# Patient Record
Sex: FEMALE | Race: WHITE | NOT HISPANIC OR LATINO | Employment: FULL TIME | URBAN - METROPOLITAN AREA
[De-identification: names, ages, dates, MRNs, and addresses within clinical notes are randomized per-mention and may not be internally consistent; named-entity substitution may affect disease eponyms.]

---

## 2023-03-15 ENCOUNTER — HOSPITAL ENCOUNTER (EMERGENCY)
Facility: HOSPITAL | Age: 68
Discharge: HOME | End: 2023-03-15
Attending: STUDENT IN AN ORGANIZED HEALTH CARE EDUCATION/TRAINING PROGRAM
Payer: MEDICARE

## 2023-03-15 VITALS
DIASTOLIC BLOOD PRESSURE: 80 MMHG | HEIGHT: 66 IN | HEART RATE: 77 BPM | SYSTOLIC BLOOD PRESSURE: 162 MMHG | TEMPERATURE: 98 F | WEIGHT: 165 LBS | OXYGEN SATURATION: 100 % | BODY MASS INDEX: 26.52 KG/M2 | RESPIRATION RATE: 20 BRPM
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
## 2023-03-15 DIAGNOSIS — M62.838 MUSCLE SPASMS OF NECK: ICD-10-CM

## 2023-03-15 DIAGNOSIS — M54.2 NECK PAIN: Primary | ICD-10-CM

## 2023-03-15 PROCEDURE — 25000003 PHARM REV CODE 250: Performed by: STUDENT IN AN ORGANIZED HEALTH CARE EDUCATION/TRAINING PROGRAM

## 2023-03-15 PROCEDURE — 99284 EMERGENCY DEPT VISIT MOD MDM: CPT | Mod: 25

## 2023-03-15 RX ORDER — METHOCARBAMOL 500 MG/1
1000 TABLET, FILM COATED ORAL 3 TIMES DAILY
Qty: 30 TABLET | Refills: 0 | Status: SHIPPED | OUTPATIENT
Start: 2023-03-15 | End: 2023-03-20

## 2023-03-15 RX ORDER — METHOCARBAMOL 500 MG/1
1000 TABLET, FILM COATED ORAL
Status: COMPLETED | OUTPATIENT
Start: 2023-03-15 | End: 2023-03-15

## 2023-03-15 RX ORDER — IBUPROFEN 600 MG/1
600 TABLET ORAL
Status: DISCONTINUED | OUTPATIENT
Start: 2023-03-15 | End: 2023-03-15 | Stop reason: HOSPADM

## 2023-03-15 RX ORDER — NAPROXEN 500 MG/1
500 TABLET ORAL 2 TIMES DAILY WITH MEALS
Qty: 20 TABLET | Refills: 0 | Status: SHIPPED | OUTPATIENT
Start: 2023-03-15 | End: 2023-03-25

## 2023-03-15 RX ADMIN — METHOCARBAMOL 1000 MG: 500 TABLET ORAL at 06:03

## 2023-04-26 ENCOUNTER — HOSPITAL ENCOUNTER (EMERGENCY)
Facility: HOSPITAL | Age: 68
Discharge: HOME | End: 2023-04-27
Attending: EMERGENCY MEDICINE
Payer: MEDICARE

## 2023-04-26 DIAGNOSIS — G50.0 TRIGEMINAL NEURALGIA: Primary | ICD-10-CM

## 2023-04-26 LAB
ALBUMIN SERPL BCP-MCNC: 3.5 G/DL (ref 3.5–5.2)
ALP SERPL-CCNC: 79 U/L (ref 55–135)
ALT SERPL W/O P-5'-P-CCNC: 13 U/L (ref 10–44)
ANION GAP SERPL CALC-SCNC: 11 MMOL/L (ref 8–16)
AST SERPL-CCNC: 12 U/L (ref 10–40)
BASOPHILS # BLD AUTO: 0.02 K/UL (ref 0–0.2)
BASOPHILS NFR BLD: 0.2 % (ref 0–1.9)
BILIRUB SERPL-MCNC: 0.2 MG/DL (ref 0.1–1)
BUN SERPL-MCNC: 14 MG/DL (ref 8–23)
CALCIUM SERPL-MCNC: 9.3 MG/DL (ref 8.7–10.5)
CHLORIDE SERPL-SCNC: 105 MMOL/L (ref 95–110)
CO2 SERPL-SCNC: 24 MMOL/L (ref 23–29)
CREAT SERPL-MCNC: 0.8 MG/DL (ref 0.5–1.4)
DIFFERENTIAL METHOD: ABNORMAL
EOSINOPHIL # BLD AUTO: 0.1 K/UL (ref 0–0.5)
EOSINOPHIL NFR BLD: 1.4 % (ref 0–8)
ERYTHROCYTE [DISTWIDTH] IN BLOOD BY AUTOMATED COUNT: 12.5 % (ref 11.5–14.5)
ERYTHROCYTE [SEDIMENTATION RATE] IN BLOOD BY PHOTOMETRIC METHOD: 18 MM/HR (ref 0–36)
EST. GFR  (NO RACE VARIABLE): >60 ML/MIN/1.73 M^2
GLUCOSE SERPL-MCNC: 93 MG/DL (ref 70–110)
HCT VFR BLD AUTO: 39.2 % (ref 37–48.5)
HGB BLD-MCNC: 12.7 G/DL (ref 12–16)
IMM GRANULOCYTES # BLD AUTO: 0.02 K/UL (ref 0–0.04)
IMM GRANULOCYTES NFR BLD AUTO: 0.2 % (ref 0–0.5)
LYMPHOCYTES # BLD AUTO: 1.3 K/UL (ref 1–4.8)
LYMPHOCYTES NFR BLD: 14.7 % (ref 18–48)
MCH RBC QN AUTO: 31.1 PG (ref 27–31)
MCHC RBC AUTO-ENTMCNC: 32.4 G/DL (ref 32–36)
MCV RBC AUTO: 96 FL (ref 82–98)
MONOCYTES # BLD AUTO: 0.7 K/UL (ref 0.3–1)
MONOCYTES NFR BLD: 7.8 % (ref 4–15)
NEUTROPHILS # BLD AUTO: 6.7 K/UL (ref 1.8–7.7)
NEUTROPHILS NFR BLD: 75.7 % (ref 38–73)
NRBC BLD-RTO: 0 /100 WBC
PLATELET # BLD AUTO: 203 K/UL (ref 150–450)
PMV BLD AUTO: 10.5 FL (ref 9.2–12.9)
POTASSIUM SERPL-SCNC: 4.1 MMOL/L (ref 3.5–5.1)
PROT SERPL-MCNC: 6.8 G/DL (ref 6–8.4)
RBC # BLD AUTO: 4.08 M/UL (ref 4–5.4)
SODIUM SERPL-SCNC: 140 MMOL/L (ref 136–145)
WBC # BLD AUTO: 8.84 K/UL (ref 3.9–12.7)

## 2023-04-26 PROCEDURE — 80053 COMPREHEN METABOLIC PANEL: CPT | Performed by: PHYSICIAN ASSISTANT

## 2023-04-26 PROCEDURE — 96375 TX/PRO/DX INJ NEW DRUG ADDON: CPT | Mod: 59

## 2023-04-26 PROCEDURE — 96361 HYDRATE IV INFUSION ADD-ON: CPT

## 2023-04-26 PROCEDURE — 85652 RBC SED RATE AUTOMATED: CPT | Performed by: PHYSICIAN ASSISTANT

## 2023-04-26 PROCEDURE — 63600175 PHARM REV CODE 636 W HCPCS: Performed by: PHYSICIAN ASSISTANT

## 2023-04-26 PROCEDURE — 85025 COMPLETE CBC W/AUTO DIFF WBC: CPT | Performed by: PHYSICIAN ASSISTANT

## 2023-04-26 PROCEDURE — 25500020 PHARM REV CODE 255: Performed by: EMERGENCY MEDICINE

## 2023-04-26 PROCEDURE — 96374 THER/PROPH/DIAG INJ IV PUSH: CPT | Mod: 59

## 2023-04-26 PROCEDURE — A9585 GADOBUTROL INJECTION: HCPCS | Performed by: EMERGENCY MEDICINE

## 2023-04-26 PROCEDURE — 99284 PR EMERGENCY DEPT VISIT,LEVEL IV: ICD-10-PCS | Mod: ,,, | Performed by: PHYSICIAN ASSISTANT

## 2023-04-26 PROCEDURE — 25000003 PHARM REV CODE 250: Performed by: PHYSICIAN ASSISTANT

## 2023-04-26 PROCEDURE — 99285 EMERGENCY DEPT VISIT HI MDM: CPT | Mod: 25

## 2023-04-26 PROCEDURE — 96376 TX/PRO/DX INJ SAME DRUG ADON: CPT

## 2023-04-26 PROCEDURE — 99284 EMERGENCY DEPT VISIT MOD MDM: CPT | Mod: ,,, | Performed by: PHYSICIAN ASSISTANT

## 2023-04-26 RX ORDER — GABAPENTIN 300 MG/1
300 CAPSULE ORAL NIGHTLY
Qty: 30 CAPSULE | Refills: 0 | Status: SHIPPED | OUTPATIENT
Start: 2023-04-26 | End: 2023-05-26

## 2023-04-26 RX ORDER — GADOBUTROL 604.72 MG/ML
9 INJECTION INTRAVENOUS
Status: COMPLETED | OUTPATIENT
Start: 2023-04-26 | End: 2023-04-26

## 2023-04-26 RX ORDER — CARBAMAZEPINE 200 MG/1
200 TABLET ORAL
Status: COMPLETED | OUTPATIENT
Start: 2023-04-26 | End: 2023-04-26

## 2023-04-26 RX ORDER — GABAPENTIN 300 MG/1
300 CAPSULE ORAL
Status: COMPLETED | OUTPATIENT
Start: 2023-04-26 | End: 2023-04-26

## 2023-04-26 RX ORDER — MORPHINE SULFATE 4 MG/ML
4 INJECTION, SOLUTION INTRAMUSCULAR; INTRAVENOUS
Status: COMPLETED | OUTPATIENT
Start: 2023-04-27 | End: 2023-04-26

## 2023-04-26 RX ORDER — MORPHINE SULFATE 4 MG/ML
4 INJECTION, SOLUTION INTRAMUSCULAR; INTRAVENOUS
Status: COMPLETED | OUTPATIENT
Start: 2023-04-26 | End: 2023-04-26

## 2023-04-26 RX ORDER — CARBAMAZEPINE 100 MG/1
TABLET, EXTENDED RELEASE ORAL
Qty: 180 TABLET | Refills: 0 | Status: SHIPPED | OUTPATIENT
Start: 2023-04-26 | End: 2023-05-26

## 2023-04-26 RX ORDER — KETOROLAC TROMETHAMINE 30 MG/ML
10 INJECTION, SOLUTION INTRAMUSCULAR; INTRAVENOUS
Status: COMPLETED | OUTPATIENT
Start: 2023-04-26 | End: 2023-04-26

## 2023-04-26 RX ORDER — TIZANIDINE 2 MG/1
4 TABLET ORAL
Status: COMPLETED | OUTPATIENT
Start: 2023-04-26 | End: 2023-04-26

## 2023-04-26 RX ADMIN — KETOROLAC TROMETHAMINE 10 MG: 30 INJECTION, SOLUTION INTRAMUSCULAR; INTRAVENOUS at 09:04

## 2023-04-26 RX ADMIN — MORPHINE SULFATE 4 MG: 4 INJECTION INTRAVENOUS at 07:04

## 2023-04-26 RX ADMIN — TIZANIDINE 4 MG: 2 TABLET ORAL at 07:04

## 2023-04-26 RX ADMIN — GABAPENTIN 300 MG: 300 CAPSULE ORAL at 07:04

## 2023-04-26 RX ADMIN — GADOBUTROL 9 ML: 604.72 INJECTION INTRAVENOUS at 10:04

## 2023-04-26 RX ADMIN — CARBAMAZEPINE 200 MG: 200 TABLET ORAL at 07:04

## 2023-04-26 RX ADMIN — SODIUM CHLORIDE, POTASSIUM CHLORIDE, SODIUM LACTATE AND CALCIUM CHLORIDE 1000 ML: 600; 310; 30; 20 INJECTION, SOLUTION INTRAVENOUS at 09:04

## 2023-04-26 RX ADMIN — MORPHINE SULFATE 4 MG: 4 INJECTION INTRAVENOUS at 11:04

## 2023-04-26 NOTE — ED NOTES
"Patient identifiers for Kasandra Ashraf 67 y.o. female checked and correct.  Chief Complaint   Patient presents with    Facial Pain     L side x 2 weeks, + numbness and tingling. Pt states, "I feel like someone put a axe through my head. My ear and eye hurts. Feels like the worse toothache you can have". Decreased appetite . Hx of trigeminal neuralgia     History reviewed. No pertinent past medical history.  Allergies reported: Review of patient's allergies indicates:  No Known Allergies      LOC: Patient is awake, alert, and aware of environment with an appropriate affect. Patient is oriented x 4 and speaking appropriately.  APPEARANCE: Patient has eyes closed and is crying. Patient is clean and well groomed, patient's clothing is properly fastened.  HEENT: - JVD, + midline trach  SKIN: The skin is warm and dry. Patient has normal skin turgor and moist mucus membranes.   MUSKULOSKELETAL: Patient is moving all extremities well, no obvious deformities noted. Pulses intact. PMS x 4  RESPIRATORY: Airway is open and patent. Respirations are spontaneous and non-labored with normal effort and rate. = CBBS  CARDIAC: Patient has a normal rate and rhythm. 68 on cardiac monitor. No peripheral edema noted. + 2 bilateral pedal and radial pulses, < 3 s cap refill  ABDOMEN: No distention noted. Soft and non-tender upon palpation.  NEUROLOGICAL: pupils 4 mm, PERRL. Facial expression is symmetrical. Hand grasps are equal bilaterally. Normal sensation in all extremities when touched with finger. Endorses 10/10 facial pain to L side.         "

## 2023-04-26 NOTE — ED TRIAGE NOTES
68 y/o F presents to ER with L face pain. Pt states that pain started several weeks ago, but has been 10/10 pain for 3 days. Pt endorses loss of appetite and nausea. Pt denies c/p, SOB, V/D, fevers, chills, numbness or tingling to extremities. Pt says her mother had trigeminal neuralgia and believes this to be the same.

## 2023-04-27 VITALS
DIASTOLIC BLOOD PRESSURE: 61 MMHG | OXYGEN SATURATION: 96 % | HEART RATE: 60 BPM | RESPIRATION RATE: 18 BRPM | TEMPERATURE: 98 F | SYSTOLIC BLOOD PRESSURE: 124 MMHG

## 2023-04-27 PROCEDURE — 63600175 PHARM REV CODE 636 W HCPCS: Performed by: EMERGENCY MEDICINE

## 2023-04-27 NOTE — DISCHARGE INSTRUCTIONS
Neurology will contact you for an appointment.  I have sent a message to schedule a close follow-up.  If you do not have for them please give them a call I have placed a number below.  Neurology has recommended increasing your carbamazepine to 200 mg in the morning and 400 mg at night.  I have also started you on gabapentin 300 mg at night.  You may continue taking the tizanidine as previously prescribed as needed.  If you develop any new or worsening of her symptoms please return to the ED.

## 2023-04-27 NOTE — PROVIDER PROGRESS NOTES - EMERGENCY DEPT.
"Signout Note    I received signout from the previous provider.     Chief complaint:  Facial Pain (L side x 2 weeks, + numbness and tingling. Pt states, "I feel like someone put a axe through my head. My ear and eye hurts. Feels like the worse toothache you can have". Decreased appetite . Hx of trigeminal neuralgia)      Pertinent history &exam:  Kasandra Ashraf is a 67 y.o. female with pertinent PMH of trigeminal neuralgia who presented to emergency department with complaint of ongoing pain despite Tegretol and gabapentin.  Case was discussed with neurology who recommend MRI brain to assess for mass pressing on the trigeminal nerve.  They also made medication recommendations and plan to see the patient in clinic.  Signed out pending MRI for final disposition.    Vitals:    04/27/23 0002   BP: (!) 123/59   Pulse: (!) 59   Resp: 18   Temp:        Imaging Studies:    MRI Brain W WO Contrast   Final Result      1.  No acute intracranial process.      2.  No abnormality along the course of the trigeminal nerves.      3.  Changes of chronic small vessel ischemic disease and cerebral volume loss.         Electronically signed by: Maury Garcia MD   Date:    04/26/2023   Time:    23:33          Medications Given:  Medications   carBAMazepine tablet 200 mg (200 mg Oral Given 4/26/23 1913)   tiZANidine tablet 4 mg (4 mg Oral Given 4/26/23 1913)   gabapentin capsule 300 mg (300 mg Oral Given 4/26/23 1913)   morphine injection 4 mg (4 mg Intravenous Given 4/26/23 1918)   lactated ringers bolus 1,000 mL (1,000 mLs Intravenous New Bag 4/26/23 2130)   ketorolac injection 9.999 mg (9.999 mg Intravenous Given 4/26/23 2130)   gadobutroL (GADAVIST) injection 9 mL (9 mLs Intravenous Given 4/26/23 2253)   morphine injection 4 mg (4 mg Intravenous Given 4/26/23 2352)       Pending Items/ MDM:  67 y.o. female with above complaint.  MRI without acute abnormality.      Patient and family updated regarding findings and plan.  Extensive questions " were answered at bedside.  Referral was placed for primary care as well as pain management.  Prescriptions were provided.    Diagnostic Impression:    1. Trigeminal neuralgia         ED Disposition Condition    Discharge Stable          ED Prescriptions       Medication Sig Dispense Start Date End Date Auth. Provider    carBAMazepine (TEGRETOL XR) 100 MG 12 hr tablet Take 2 tablets (200 mg total) by mouth every morning AND 4 tablets (400 mg total) every evening. 180 tablet 4/26/2023 5/26/2023 Michael Velazco PA-C    gabapentin (NEURONTIN) 300 MG capsule Take 1 capsule (300 mg total) by mouth every evening. 30 capsule 4/26/2023 5/26/2023 Michael Velazco PA-C          Follow-up Information       Follow up With Specialties Details Why Contact Info Additional Information    [facility] Neu[facility] Schedule an appointment as soon as possible for a visit   [id number]  [place] [address] [phone] [facility] - Main Building, 7th Floor Please park in [facility] and take Clinic elevator            Patient and family understand the plan and is in agreement, verbalized understanding, questions answered    Lilly Nowak MD  Emergency Medicine

## 2023-04-27 NOTE — ED PROVIDER NOTES
"Encounter Date: 2023       History     Chief Complaint   Patient presents with    Facial Pain     L side x 2 weeks, + numbness and tingling. Pt states, "I feel like someone put a axe through my head. My ear and eye hurts. Feels like the worse toothache you can have". Decreased appetite . Hx of trigeminal neuralgia     67-year-old female with self-diagnosed trigeminal neuralgia presents emergency department for left-sided facial pain.  Reports 2-3 weeks of constant left-sided facial pain and was seen in the ED twice for this.  Reports her mother had trigeminal neuralgia and states her symptoms are similar to her mother's.  States she has sharp pain to her left side of her face which she describes as "a pick ax" complains of left ear and left-sided dental pain as well.  States she attempted to make a neurology appointment but was unable to get anything until .  States her pain has worsened over the past 3 days.  Was started on Tegretol in the ED and on her 2nd visit they up titrated it 2 days ago.  She is currently taking 200 mg b.i.d. as well as tizanidine and oxycodone with no relief.    The history is provided by the patient.   Review of patient's allergies indicates:  No Known Allergies  History reviewed. No pertinent past medical history.  Past Surgical History:   Procedure Laterality Date    APPENDECTOMY       SECTION       History reviewed. No pertinent family history.  Social History     Tobacco Use    Smoking status: Never    Smokeless tobacco: Never   Substance Use Topics    Alcohol use: Never    Drug use: Never     Review of Systems   Constitutional:  Negative for chills and fever.   HENT:  Negative for sore throat.    Respiratory:  Negative for shortness of breath.    Cardiovascular:  Negative for chest pain.   Gastrointestinal:  Negative for abdominal pain.   Genitourinary:  Negative for difficulty urinating.   Musculoskeletal: Negative.    Neurological:  Positive for headaches. "   Psychiatric/Behavioral:  Negative for confusion.      Physical Exam     Initial Vitals [04/26/23 1753]   BP Pulse Resp Temp SpO2   (!) 145/74 74 16 98.5 °F (36.9 °C) 95 %      MAP       --         Physical Exam    Nursing note and vitals reviewed.  Constitutional: She appears well-developed and well-nourished. She is not diaphoretic. She appears distressed.   HENT:   Head: Normocephalic and atraumatic.   Eyes: Conjunctivae and EOM are normal. Pupils are equal, round, and reactive to light.   Neck: Neck supple.   Normal range of motion.  Cardiovascular:  Normal rate, regular rhythm, normal heart sounds and intact distal pulses.     Exam reveals no gallop and no friction rub.       No murmur heard.  Pulmonary/Chest: Breath sounds normal.   Abdominal: Abdomen is soft. Bowel sounds are normal. There is no abdominal tenderness.   Musculoskeletal:         General: Normal range of motion.      Cervical back: Normal range of motion and neck supple.     Neurological: She is alert and oriented to person, place, and time. She has normal strength. No cranial nerve deficit. GCS score is 15. GCS eye subscore is 4. GCS verbal subscore is 5. GCS motor subscore is 6.   Skin: Skin is warm and dry. Capillary refill takes less than 2 seconds.   Psychiatric: She has a normal mood and affect.       ED Course   Procedures  Labs Reviewed   CBC W/ AUTO DIFFERENTIAL - Abnormal; Notable for the following components:       Result Value    MCH 31.1 (*)     Gran % 75.7 (*)     Lymph % 14.7 (*)     All other components within normal limits   COMPREHENSIVE METABOLIC PANEL   SEDIMENTATION RATE   HIV 1 / 2 ANTIBODY   HEPATITIS C ANTIBODY          Imaging Results    None          Medications   lactated ringers bolus 1,000 mL (1,000 mLs Intravenous New Bag 4/26/23 2130)   carBAMazepine tablet 200 mg (200 mg Oral Given 4/26/23 1913)   tiZANidine tablet 4 mg (4 mg Oral Given 4/26/23 1913)   gabapentin capsule 300 mg (300 mg Oral Given 4/26/23 1913)    morphine injection 4 mg (4 mg Intravenous Given 4/26/23 1918)   ketorolac injection 9.999 mg (9.999 mg Intravenous Given 4/26/23 2130)     Medical Decision Making:   History:   Old Medical Records: I decided to obtain old medical records.  Initial Assessment:   67-year-old female presents emergency department for left-sided facial pain.  Has self-diagnosed trigeminal neuralgia.  Seen in the ED multiple times and was recently started on Tegretol and tizanidine for this.  Worsening pain over the past 3 days.  Differential Diagnosis:   Trigeminal neuralgia, otitis media, giant cell arteritis, dental abscess  Clinical Tests:   Lab Tests: Ordered and Reviewed  Radiological Study: Ordered and Reviewed  ED Management:  Exam not consistent with GCA as she has left-sided facial pain.  I did obtain a ESR which was normal.  No visual deficits.  No focal neurological deficits.  Was given carbamazepine, tizanidine, gabapentin as well as a dose of morphine in the ED with significant improvement of her pain.  She was unable to get a close Neurology follow-up and I discussed the case with Neurology who will contact her scheduled her for close outpatient follow-up.  They do recommend MRI brain with and without to assess for any inflammation or compressive issues.  They recommend up titrating her carbamazepine and adding gabapentin to her regiment and continue with her tizanidine as she does endorse some relief with this.  At shift change she is pending MRI and patient care handoff given to ED team will follow-up on imaging.           ED Course as of 04/26/23 2209 Wed Apr 26, 2023 1956 Sed Rate: 18 [HJ]      ED Course User Index  [HJ] Michael Velazco PA-C                 Clinical Impression:   Final diagnoses:  [G50.0] Trigeminal neuralgia (Primary)               Michael Velazco PA-C  04/26/23 2209

## 2023-05-22 ENCOUNTER — TELEPHONE (OUTPATIENT)
Dept: NEUROLOGY | Facility: CLINIC | Age: 68
End: 2023-05-22
Payer: MEDICARE

## 2025-05-05 ENCOUNTER — TELEPHONE (OUTPATIENT)
Dept: URGENT CARE | Facility: CLINIC | Age: 70
End: 2025-05-05
Payer: MEDICARE